# Patient Record
Sex: MALE | Race: WHITE | Employment: FULL TIME | URBAN - METROPOLITAN AREA
[De-identification: names, ages, dates, MRNs, and addresses within clinical notes are randomized per-mention and may not be internally consistent; named-entity substitution may affect disease eponyms.]

---

## 2020-02-19 ENCOUNTER — TELEPHONE (OUTPATIENT)
Dept: FAMILY MEDICINE | Facility: CLINIC | Age: 30
End: 2020-02-19

## 2020-02-19 ENCOUNTER — OFFICE VISIT (OUTPATIENT)
Dept: FAMILY MEDICINE | Facility: CLINIC | Age: 30
End: 2020-02-19
Payer: COMMERCIAL

## 2020-02-19 VITALS
OXYGEN SATURATION: 97 % | TEMPERATURE: 98.5 F | RESPIRATION RATE: 18 BRPM | WEIGHT: 155.8 LBS | HEART RATE: 71 BPM | SYSTOLIC BLOOD PRESSURE: 125 MMHG | DIASTOLIC BLOOD PRESSURE: 81 MMHG | HEIGHT: 67 IN | BODY MASS INDEX: 24.45 KG/M2

## 2020-02-19 DIAGNOSIS — R51.9 HEADACHE, UNSPECIFIED HEADACHE TYPE: Primary | ICD-10-CM

## 2020-02-19 LAB
ALBUMIN SERPL-MCNC: 3.8 G/DL (ref 3.4–5)
ALP SERPL-CCNC: 106 U/L (ref 40–150)
ALT SERPL W P-5'-P-CCNC: 26 U/L (ref 0–70)
ANION GAP SERPL CALCULATED.3IONS-SCNC: 7 MMOL/L (ref 3–14)
AST SERPL W P-5'-P-CCNC: 17 U/L (ref 0–45)
BASOPHILS # BLD AUTO: 0 10E9/L (ref 0–0.2)
BASOPHILS NFR BLD AUTO: 0.2 %
BILIRUB SERPL-MCNC: 0.5 MG/DL (ref 0.2–1.3)
BUN SERPL-MCNC: 13 MG/DL (ref 7–30)
CALCIUM SERPL-MCNC: 9.1 MG/DL (ref 8.5–10.1)
CHLORIDE SERPL-SCNC: 106 MMOL/L (ref 94–109)
CO2 SERPL-SCNC: 25 MMOL/L (ref 20–32)
CREAT SERPL-MCNC: 0.79 MG/DL (ref 0.66–1.25)
CRP SERPL-MCNC: 65.5 MG/L (ref 0–8)
DIFFERENTIAL METHOD BLD: NORMAL
EOSINOPHIL # BLD AUTO: 0.1 10E9/L (ref 0–0.7)
EOSINOPHIL NFR BLD AUTO: 0.9 %
ERYTHROCYTE [DISTWIDTH] IN BLOOD BY AUTOMATED COUNT: 13.2 % (ref 10–15)
ERYTHROCYTE [SEDIMENTATION RATE] IN BLOOD BY WESTERGREN METHOD: 15 MM/H (ref 0–15)
GFR SERPL CREATININE-BSD FRML MDRD: >90 ML/MIN/{1.73_M2}
GLUCOSE SERPL-MCNC: 82 MG/DL (ref 70–99)
HCT VFR BLD AUTO: 42.9 % (ref 40–53)
HGB BLD-MCNC: 14.9 G/DL (ref 13.3–17.7)
LYMPHOCYTES # BLD AUTO: 1.3 10E9/L (ref 0.8–5.3)
LYMPHOCYTES NFR BLD AUTO: 13.6 %
MCH RBC QN AUTO: 28.9 PG (ref 26.5–33)
MCHC RBC AUTO-ENTMCNC: 34.7 G/DL (ref 31.5–36.5)
MCV RBC AUTO: 83 FL (ref 78–100)
MONOCYTES # BLD AUTO: 1.3 10E9/L (ref 0–1.3)
MONOCYTES NFR BLD AUTO: 13.9 %
NEUTROPHILS # BLD AUTO: 6.6 10E9/L (ref 1.6–8.3)
NEUTROPHILS NFR BLD AUTO: 71.4 %
PLATELET # BLD AUTO: 200 10E9/L (ref 150–450)
POTASSIUM SERPL-SCNC: 3.8 MMOL/L (ref 3.4–5.3)
PROT SERPL-MCNC: 7.7 G/DL (ref 6.8–8.8)
RBC # BLD AUTO: 5.15 10E12/L (ref 4.4–5.9)
SODIUM SERPL-SCNC: 138 MMOL/L (ref 133–144)
WBC # BLD AUTO: 9.3 10E9/L (ref 4–11)

## 2020-02-19 PROCEDURE — 85025 COMPLETE CBC W/AUTO DIFF WBC: CPT | Performed by: FAMILY MEDICINE

## 2020-02-19 PROCEDURE — 80053 COMPREHEN METABOLIC PANEL: CPT | Performed by: FAMILY MEDICINE

## 2020-02-19 PROCEDURE — 36415 COLL VENOUS BLD VENIPUNCTURE: CPT | Performed by: FAMILY MEDICINE

## 2020-02-19 PROCEDURE — 86140 C-REACTIVE PROTEIN: CPT | Performed by: FAMILY MEDICINE

## 2020-02-19 PROCEDURE — 99203 OFFICE O/P NEW LOW 30 MIN: CPT | Performed by: FAMILY MEDICINE

## 2020-02-19 PROCEDURE — 85652 RBC SED RATE AUTOMATED: CPT | Performed by: FAMILY MEDICINE

## 2020-02-19 RX ORDER — KETOROLAC TROMETHAMINE 10 MG/1
10 TABLET, FILM COATED ORAL EVERY 6 HOURS PRN
Qty: 20 TABLET | Refills: 0 | Status: SHIPPED | OUTPATIENT
Start: 2020-02-19 | End: 2020-02-24

## 2020-02-19 ASSESSMENT — PAIN SCALES - GENERAL: PAINLEVEL: SEVERE PAIN (6)

## 2020-02-19 ASSESSMENT — MIFFLIN-ST. JEOR: SCORE: 1632.39

## 2020-02-19 NOTE — PROGRESS NOTES
Subjective     Gerry Jackson is a 29 year old male who presents to clinic today for the following health issues:    HPI   Headache  Onset: Sunday     Description:   Location: bilateral in the temporal area   Character: throbbing pain, squeezing pain, and sometimes sharp    Intensity: severe    Progression of Symptoms:  constant    Accompanying Signs & Symptoms:  Stiff neck: YES  Neck or upper back pain: YES  Fever: no  Sinus pressure: no- but has sore throat in the mornings then goes away   Nausea or vomiting: YES  Dizziness: no  Numbness: no  Weakness: YES  Visual changes: no    History:   Head trauma: no  Family history of migraines: no  Previous tests for headaches: no  Neurologist evaluations: no  Able to do daily activities: YES  Wake with a headaches: YES  Do headaches wake you up: YES  Daily pain medication use: no  Work/school stressors/changes: no    Precipitating factors:   Does light make it worse: YES  Does sound make it worse: no     Alleviating factors:  Does sleep help: YES- seems to then when wakes up starts again     Therapies Tried and outcome: Ibuprofen (Advil, Motrin) and Tylenol- no relief     There is no problem list on file for this patient.    History reviewed. No pertinent surgical history.    Social History     Tobacco Use     Smoking status: Never Smoker     Smokeless tobacco: Never Used     Tobacco comment: some exposure   Substance Use Topics     Alcohol use: No     History reviewed. No pertinent family history.      Current Outpatient Medications   Medication Sig Dispense Refill     ketorolac 10 MG PO tablet Take 1 tablet (10 mg) by mouth every 6 hours as needed for moderate pain 20 tablet 0     TYLENOL EXTRA STRENGTH 500 MG OR TABS 1 tab prn       IBU-200 OR 2 tabs prn        No Known Allergies  BP Readings from Last 3 Encounters:   02/19/20 125/81   04/17/12 110/72   01/06/12 131/82    Wt Readings from Last 3 Encounters:   02/19/20 70.7 kg (155 lb 12.8 oz)   04/17/12 65.8 kg (145 lb)  "(33 %)*   01/06/12 66.7 kg (147 lb) (38 %)*     * Growth percentiles are based on [facility] (Boys, 2-20 Years) data.               Reviewed and updated as needed this visit by Provider         Review of Systems   ROS COMP: Constitutional, HEENT, cardiovascular, pulmonary, GI, , musculoskeletal, neuro, skin, endocrine and psych systems are negative, except as otherwise noted.      Objective    /81 (BP Location: Left arm, Patient Position: Sitting, Cuff Size: Adult Large)   Pulse 71   Temp 98.5  F (36.9  C) (Oral)   Resp 18   Ht 1.689 m (5' 6.5\")   Wt 70.7 kg (155 lb 12.8 oz)   SpO2 97%   BMI 24.77 kg/m    Body mass index is 24.77 kg/m .  Physical Exam   GENERAL: healthy, alert and no distress  NECK: no adenopathy, no asymmetry, masses, or scars and thyroid normal to palpation  RESP: lungs clear to auscultation - no rales, rhonchi or wheezes  CV: regular rate and rhythm, normal S1 S2, no S3 or S4, no murmur, click or rub, no peripheral edema and peripheral pulses strong  ABDOMEN: soft, nontender, no hepatosplenomegaly, no masses and bowel sounds normal  MS: no gross musculoskeletal defects noted, no edema  NEURO: Normal strength and tone, mentation intact and speech normal    Diagnostic Test Results:  Labs reviewed in Epic        Assessment & Plan     1. Headache, unspecified headache type  Likely tension headaches. Possible start of viral syndrome. Treat with nsaid, toradol, for the next 5 days. Discussed rest and pushing fluids. R/o temporal arteritis. If symptoms worsen or any new neurological signs can order brain imaging. At this time, no need for this since exam normal. Other diff: ca's, aneurysms but unlikely.  - ketorolac 10 MG PO tablet; Take 1 tablet (10 mg) by mouth every 6 hours as needed for moderate pain  Dispense: 20 tablet; Refill: 0  - CBC with platelets differential  - Erythrocyte sedimentation rate auto  - CRP inflammation  - Comprehensive metabolic panel (BMP + Alb, Alk Phos, ALT, AST, " Total. Bili, TP)       See Patient Instructions    Return in about 4 weeks (around 3/18/2020) for as needed.    Himanshu Roche MD, MD  [facility]

## 2020-02-19 NOTE — LETTER
February 19, 2020      Gerry Jackson  [address]  [place] [address]          Dear ,    We are writing to inform you of your test results.    Your blood count, kidney, liver, electrolyte, blood sugar tests were normal. One of your inflammatory marker tests were elevated, the CRP. This is nonspecific, can be related to possible viral illness that you are experiencing. I have ordered another CRP test. Your can make a lab-only appointment in 3-4 weeks for recheck.     Resulted Orders   CBC with platelets differential   Result Value Ref Range    WBC 9.3 4.0 - 11.0 10e9/L    RBC Count 5.15 4.4 - 5.9 10e12/L    Hemoglobin 14.9 13.3 - 17.7 g/dL    Hematocrit 42.9 40.0 - 53.0 %    MCV 83 78 - 100 fl    MCH 28.9 26.5 - 33.0 pg    MCHC 34.7 31.5 - 36.5 g/dL    RDW 13.2 10.0 - 15.0 %    Platelet Count 200 150 - 450 10e9/L    % Neutrophils 71.4 %    % Lymphocytes 13.6 %    % Monocytes 13.9 %    % Eosinophils 0.9 %    % Basophils 0.2 %    Absolute Neutrophil 6.6 1.6 - 8.3 10e9/L    Absolute Lymphocytes 1.3 0.8 - 5.3 10e9/L    Absolute Monocytes 1.3 0.0 - 1.3 10e9/L    Absolute Eosinophils 0.1 0.0 - 0.7 10e9/L    Absolute Basophils 0.0 0.0 - 0.2 10e9/L    Diff Method Automated Method    Erythrocyte sedimentation rate auto   Result Value Ref Range    Sed Rate 15 0 - 15 mm/h   CRP inflammation   Result Value Ref Range    CRP Inflammation 65.5 (H) 0.0 - 8.0 mg/L   Comprehensive metabolic panel (BMP + Alb, Alk Phos, ALT, AST, Total. Bili, TP)   Result Value Ref Range    Sodium 138 133 - 144 mmol/L    Potassium 3.8 3.4 - 5.3 mmol/L    Chloride 106 94 - 109 mmol/L    Carbon Dioxide 25 20 - 32 mmol/L    Anion Gap 7 3 - 14 mmol/L    Glucose 82 70 - 99 mg/dL      Comment:      Non Fasting    Urea Nitrogen 13 7 - 30 mg/dL    Creatinine 0.79 0.66 - 1.25 mg/dL    GFR Estimate >90 >60 mL/min/[1.73_m2]      Comment:      Non  GFR Calc  Starting 12/18/2018, serum creatinine based estimated GFR (eGFR) will be    calculated using the Chronic Kidney Disease Epidemiology Collaboration   (CKD-EPI) equation.      GFR Estimate If Black >90 >60 mL/min/[1.73_m2]      Comment:       GFR Calc  Starting 12/18/2018, serum creatinine based estimated GFR (eGFR) will be   calculated using the Chronic Kidney Disease Epidemiology Collaboration   (CKD-EPI) equation.      Calcium 9.1 8.5 - 10.1 mg/dL    Bilirubin Total 0.5 0.2 - 1.3 mg/dL    Albumin 3.8 3.4 - 5.0 g/dL    Protein Total 7.7 6.8 - 8.8 g/dL    Alkaline Phosphatase 106 40 - 150 U/L    ALT 26 0 - 70 U/L    AST 17 0 - 45 U/L       If you have any questions or concerns, please call the clinic at the number listed above.       Sincerely,    Himanshu Roche MD/ariel

## 2020-02-19 NOTE — LETTER
[address] [address]  Phone: [phone]    February 19, 2020        Gerry Jackson  [address] [place], [place] [address] [address]          To whom it may concern:    RE: Gerry aJckson    Patient was seen and treated today at our clinic and missed work.  Patient may return to work 2/24/2020 with the following:  No working or lifting restrictions    Please contact me for questions or concerns.      Sincerely,        Himanshu Roche MD